# Patient Record
Sex: MALE | Race: WHITE | NOT HISPANIC OR LATINO
[De-identification: names, ages, dates, MRNs, and addresses within clinical notes are randomized per-mention and may not be internally consistent; named-entity substitution may affect disease eponyms.]

---

## 2022-07-06 ENCOUNTER — APPOINTMENT (OUTPATIENT)
Dept: ORTHOPEDIC SURGERY | Facility: CLINIC | Age: 47
End: 2022-07-06

## 2022-07-06 VITALS — BODY MASS INDEX: 29.52 KG/M2 | HEIGHT: 74 IN | WEIGHT: 230 LBS

## 2022-07-06 DIAGNOSIS — Z78.9 OTHER SPECIFIED HEALTH STATUS: ICD-10-CM

## 2022-07-06 PROBLEM — Z00.00 ENCOUNTER FOR PREVENTIVE HEALTH EXAMINATION: Status: ACTIVE | Noted: 2022-07-06

## 2022-07-06 PROCEDURE — 99204 OFFICE O/P NEW MOD 45 MIN: CPT

## 2022-07-06 RX ORDER — TIZANIDINE HYDROCHLORIDE 4 MG/1
4 CAPSULE ORAL
Qty: 40 | Refills: 0 | Status: ACTIVE | COMMUNITY
Start: 2022-07-06 | End: 1900-01-01

## 2022-07-06 NOTE — DISCUSSION/SUMMARY
[Surgical risks reviewed] : Surgical risks reviewed [de-identified] : heat,  stretching\par  \par \par RE:  DALILA PEÑALOZA \par \par Acct #- [id number] \par \par \par \par Attention:  Nurse Reviewer /Medical Director\par \par  \par Based on my patient's condition, I strongly believe that the MRI C spine is medically.necessary.  \par The patient has failed oral meds, injections and PT and conservative treatment in combination or by themselves and therefore needs the MRI.  \par The MRI will dictate further treatment t recommendations.\par

## 2022-07-06 NOTE — HISTORY OF PRESENT ILLNESS
[Neck] : neck [Gradual] : gradual [6] : 6 [Dull/Aching] : dull/aching [Throbbing] : throbbing [Tingling] : tingling [Constant] : constant [Sleep] : sleep [de-identified] : 46 year old male with pain in the right "shoulder" region, started a month ago, no specific injury. He was seen by PCP, placed on NSAIDS and remains symptomatic. He underwent Xrays of the neck. He has persistent numbness tingling into the ring and small finger,. He exercises regularly and has noticed he is weaker on the right, globally, versus the left. He has been undergoing chiro treatment without much difference. [] : no [FreeTextEntry1] : right shoulder [FreeTextEntry5] : No known injury, pain and numbness started occurring about a month ago [FreeTextEntry7] : hand

## 2022-07-06 NOTE — PHYSICAL EXAM
[Right] : right shoulder [Outside films reviewed] : Outside films reviewed [Disc space narrowing] : Disc space narrowing [] : negative impingement testing

## 2022-07-10 ENCOUNTER — FORM ENCOUNTER (OUTPATIENT)
Age: 47
End: 2022-07-10

## 2022-07-11 ENCOUNTER — APPOINTMENT (OUTPATIENT)
Dept: MRI IMAGING | Facility: CLINIC | Age: 47
End: 2022-07-11

## 2022-07-11 PROCEDURE — 72141 MRI NECK SPINE W/O DYE: CPT

## 2022-07-19 ENCOUNTER — APPOINTMENT (OUTPATIENT)
Dept: ORTHOPEDIC SURGERY | Facility: CLINIC | Age: 47
End: 2022-07-19

## 2022-07-19 VITALS — HEIGHT: 74 IN | BODY MASS INDEX: 29.52 KG/M2 | WEIGHT: 230 LBS

## 2022-07-19 PROCEDURE — 20552 NJX 1/MLT TRIGGER POINT 1/2: CPT

## 2022-07-19 PROCEDURE — 99204 OFFICE O/P NEW MOD 45 MIN: CPT | Mod: 25

## 2022-07-19 PROCEDURE — 99214 OFFICE O/P EST MOD 30 MIN: CPT | Mod: 25

## 2022-07-19 NOTE — ASSESSMENT
[FreeTextEntry1] : 47 y/o M presenting with C7 radiculopathy that has been progressively improving. Most likely will continue to improve with time. TPI given to right trapezius. Patient instructed if does not get better with time to follow up and other options with be explored. Patient understood.

## 2022-07-19 NOTE — DATA REVIEWED
[FreeTextEntry1] : MRI C Spine: Central Disc Herniation C3-4/ Large paracentral disc herniation at C7-T1 with impingement on C8 nerve root.

## 2022-07-19 NOTE — PROCEDURE
[Trigger point 1-2 muscle groups] : trigger point 1-2 muscle groups [Right] : of the right [Cervical paraspinal muscle] : cervical paraspinal muscle [___ cc    1%] : Lidocaine ~Vcc of 1%  [___ cc    40mg] : Triamcinolone (Kenalog) ~Vcc of 40 mg  [Call if redness, pain or fever occur] : call if redness, pain or fever occur [Apply ice for 15min out of every hour for the next 12-24 hours as tolerated] : apply ice for 15 minutes out of every hour for the next 12-24 hours as tolerated [Previous OTC use and PT nontherapeutic] : patient has tried OTC's including aspirin, Ibuprofen, Aleve, etc or prescription NSAIDS, and/or exercises at home and/or physical therapy without satisfactory response [Patient had decreased mobility in the joint] : patient had decreased mobility in the joint [Risks, benefits, alternatives discussed / Verbal consent obtained] : the risks benefits, and alternatives have been discussed, and verbal consent was obtained

## 2022-07-19 NOTE — HISTORY OF PRESENT ILLNESS
[5] : 5 [2] : 2 [Sharp] : sharp [Throbbing] : throbbing [Tingling] : tingling [Sleep] : sleep [de-identified] : 45 y/o M presenting with one month of neck pain and RUE pain. States pain goes down the right shoulder blade down the arm. Has numbness in the pinky and ring finger. States went to chiropractor with no relieve. States was given anti-inflammatories from the GP and tizanidine from Dr. Borges. States he feels he is weaker when he works out in the RUE. Goes to the gym and does exercises on his own. States the pain has been improving. Continuing to have the numbness but states the middle finger feeling has returned.  [] : no [FreeTextEntry1] : neck [FreeTextEntry3] : 6 weeks ago [FreeTextEntry5] : pt states that he woke up one day and his neck started to hurt [FreeTextEntry6] : numbness [FreeTextEntry7] : right arm and down to his fingers [de-identified] : motion [de-identified] : 7/6/2022 [de-identified] : Dr. Borges [de-identified] : xrays and MRI

## 2022-07-19 NOTE — IMAGING
[de-identified] : Strength:\par Deltoid: 5/5 b/l\par Biceps: 5/5 b/l\par Tricep: 5/5 b/l\par Wrist flexor/extensors: 5/5 b/l\par Interosseous: 5/5 b/l\par Grasp: 5/5 b/l\par \par Sensation: Grossly Intact\par Hoffmans: Negative\par Spurling: Negative

## 2022-11-11 ENCOUNTER — APPOINTMENT (OUTPATIENT)
Dept: PAIN MANAGEMENT | Facility: CLINIC | Age: 47
End: 2022-11-11

## 2022-11-11 VITALS — HEIGHT: 74 IN | BODY MASS INDEX: 29.52 KG/M2 | WEIGHT: 230 LBS

## 2022-11-11 DIAGNOSIS — Z78.9 OTHER SPECIFIED HEALTH STATUS: ICD-10-CM

## 2022-11-11 PROCEDURE — 99204 OFFICE O/P NEW MOD 45 MIN: CPT

## 2022-11-11 NOTE — ASSESSMENT
[FreeTextEntry1] : After discussing various treatment options with the patient including but not limited to oral medications, physical therapy, exercise, modalities as well as interventional spinal injections, we have decided with the following plan:\par \par 1) Intervention Injection Therapy:\par I personally reviewed the MRI/CT scan images and agree with the radiologist's report. The radiological findings were discussed with the patient.\par The risks, benefits, contents and alternatives to injection were explained in full to the patient. Risks outlined include but are not limited to infection,sepsis, bleeding, post-dural puncture headache, nerve damage, temporary increase in pain, syncopal episode, failure to resolve symptoms, allergic reaction, symptom recurrence, and elevation of blood sugar in diabetics. Cortisone may cause immunosuppression. Patient understands the risks. All questions were answered. After discussion of options, patient requested an injection. Information regarding the injection was given to the patient. Which medications to stop prior to the injection was explained to the patient as well.\par \par Follow up in 1-2 weeks post injection for re-evaluation. \par Continue Home exercises, stretching, activity modification, physical therapy, and conservative care.\par \par Patient is presenting with acute/sub-acute radicular pain with impairment in ADLs and functionality.  The pain has not responded to  conservative care including nsaid therapy and/or physical therapy.  There is no bleeding tendency, unstable medical condition, or systemic infection.\par \par \par C7-T1 Cervical Epidural Steroid Injection under fluoroscopic guidance with image.  none

## 2022-11-11 NOTE — HISTORY OF PRESENT ILLNESS
[Neck] : neck [7] : 7 [5] : 5 [Dull/Aching] : dull/aching [Tingling] : tingling [Constant] : constant [Sleep] : sleep [Rest] : rest [FreeTextEntry1] : 11/10/2022 : Patient presents for initial evaluation. He reports pain in the neck. +tingling in the right arm. pain in the shoulder and left trap since June.  +n/t. feels as if he getting stronger. He went to chiropractor. \par \par Subjective Weakness: Yes\par Numbness/Tingling: Yes\par Bladder/Bowel dysfunction: No\par Gait Abnormalities:No\par Fine motor coordination changes:No\par \par Attempted modalities for current pain complaint:\par See above:\par Medications: No\par \par Injections: No \par \par Previous Spine Surgery: N/A\par \par Imaging:\par MRI Cervical Spine (7/11/22-OC) -Impression: \par 1. Large right paracentral/foraminal extrusion at C7-T1 encroaches upon the cord and impinges the right exiting \par C8 nerve root with severe right foraminal narrowing.\par 2. Slight reversal of the cervical lordosis, multilevel degenerative disc disease, and multiple disc herniations \par with cord impingement and right greater than left exiting C4 nerve root impingement at C3-C4, impingement \par upon the right exiting C5 nerve root with severe right foraminal narrowing at C4-C5, and multilevel \par uncovertebral degenerative changes most severe in the upper cervical spine on the left.\par 3. No acute fracture or cord compression.\par 4. Patient motion degrades image quality on multiple sequences.\par   [] : no [FreeTextEntry6] : numbness  [FreeTextEntry7] : left shoulder down to the fingers [de-identified] : certain activities  [de-identified] : MRI cervical 7/11/22 [de-identified] : m

## 2022-11-28 ENCOUNTER — NON-APPOINTMENT (OUTPATIENT)
Age: 47
End: 2022-11-28

## 2022-12-01 ENCOUNTER — APPOINTMENT (OUTPATIENT)
Age: 47
End: 2022-12-01

## 2022-12-01 PROCEDURE — 62321 NJX INTERLAMINAR CRV/THRC: CPT

## 2022-12-15 PROBLEM — M54.12 CERVICAL RADICULITIS: Status: ACTIVE | Noted: 2022-07-06

## 2022-12-16 ENCOUNTER — APPOINTMENT (OUTPATIENT)
Dept: PAIN MANAGEMENT | Facility: CLINIC | Age: 47
End: 2022-12-16

## 2022-12-16 VITALS — BODY MASS INDEX: 29.52 KG/M2 | WEIGHT: 230 LBS | HEIGHT: 74 IN

## 2022-12-16 DIAGNOSIS — M54.12 RADICULOPATHY, CERVICAL REGION: ICD-10-CM

## 2022-12-16 PROCEDURE — 99214 OFFICE O/P EST MOD 30 MIN: CPT

## 2022-12-16 NOTE — HISTORY OF PRESENT ILLNESS
[Neck] : neck [7] : 7 [5] : 5 [Dull/Aching] : dull/aching [Tingling] : tingling [Constant] : constant [Sleep] : sleep [Rest] : rest [de-identified] : 12/16/22- fu for MÓNICA 12/1.  less than 60% relief.  Will schedule repeat.  \par \par 11/10/2022 : Patient presents for initial evaluation. He reports pain in the neck. +tingling in the right arm. pain in the shoulder and left trap since June.  +n/t. feels as if he getting stronger. He went to chiropractor. \par \par Subjective Weakness: Yes\par Numbness/Tingling: Yes\par Bladder/Bowel dysfunction: No\par Gait Abnormalities:No\par Fine motor coordination changes:No\par \par Attempted modalities for current pain complaint:\par See above:\par Medications: No\par \par Injections: No \par MÓNICA 12/1/22\par \par Previous Spine Surgery: N/A\par \par Imaging:\par MRI Cervical Spine (7/11/22-OC) -Impression: \par 1. Large right paracentral/foraminal extrusion at C7-T1 encroaches upon the cord and impinges the right exiting \par C8 nerve root with severe right foraminal narrowing.\par 2. Slight reversal of the cervical lordosis, multilevel degenerative disc disease, and multiple disc herniations \par with cord impingement and right greater than left exiting C4 nerve root impingement at C3-C4, impingement \par upon the right exiting C5 nerve root with severe right foraminal narrowing at C4-C5, and multilevel \par uncovertebral degenerative changes most severe in the upper cervical spine on the left.\par 3. No acute fracture or cord compression.\par 4. Patient motion degrades image quality on multiple sequences.\par   [] : no [FreeTextEntry6] : numbness  [FreeTextEntry7] : left shoulder down to the fingers [de-identified] : certain activities  [de-identified] : MRI cervical 7/11/22 [FreeTextEntry1] : 12/16/22- fu for MÓNICA 12/1.  \par \par 11/10/2022 : Patient presents for initial evaluation. He reports pain in the neck. +tingling in the right arm. pain in the shoulder and left trap since June.  +n/t. feels as if he getting stronger. He went to chiropractor. \par \par Subjective Weakness: Yes\par Numbness/Tingling: Yes\par Bladder/Bowel dysfunction: No\par Gait Abnormalities:No\par Fine motor coordination changes:No\par \par Attempted modalities for current pain complaint:\par See above:\par Medications: No\par \par Injections: No \par MÓNICA 12/1/22\par \par Previous Spine Surgery: N/A\par \par Imaging:\par MRI Cervical Spine (7/11/22-OC) -Impression: \par 1. Large right paracentral/foraminal extrusion at C7-T1 encroaches upon the cord and impinges the right exiting \par C8 nerve root with severe right foraminal narrowing.\par 2. Slight reversal of the cervical lordosis, multilevel degenerative disc disease, and multiple disc herniations \par with cord impingement and right greater than left exiting C4 nerve root impingement at C3-C4, impingement \par upon the right exiting C5 nerve root with severe right foraminal narrowing at C4-C5, and multilevel \par uncovertebral degenerative changes most severe in the upper cervical spine on the left.\par 3. No acute fracture or cord compression.\par 4. Patient motion degrades image quality on multiple sequences.\par

## 2022-12-16 NOTE — ASSESSMENT
[FreeTextEntry1] : After discussing various treatment options with the patient including but not limited to oral medications, physical therapy, exercise, modalities as well as interventional spinal injections, we have decided with the following plan:\par \par 1) Intervention Injection Therapy:\par I personally reviewed the MRI/CT scan images and agree with the radiologist's report. The radiological findings were discussed with the patient.\par The risks, benefits, contents and alternatives to injection were explained in full to the patient. Risks outlined include but are not limited to infection,sepsis, bleeding, post-dural puncture headache, nerve damage, temporary increase in pain, syncopal episode, failure to resolve symptoms, allergic reaction, symptom recurrence, and elevation of blood sugar in diabetics. Cortisone may cause immunosuppression. Patient understands the risks. All questions were answered. After discussion of options, patient requested an injection. Information regarding the injection was given to the patient. Which medications to stop prior to the injection was explained to the patient as well.\par \par Follow up in 1-2 weeks post injection for re-evaluation. \par Continue Home exercises, stretching, activity modification, physical therapy, and conservative care.\par \par Patient is presenting with acute/sub-acute radicular pain with impairment in ADLs and functionality.  The pain has not responded to  conservative care including nsaid therapy and/or physical therapy.  There is no bleeding tendency, unstable medical condition, or systemic infection.\par \par \par C7-T1 Cervical Epidural Steroid Injection under fluoroscopic guidance with image.

## 2022-12-18 ENCOUNTER — NON-APPOINTMENT (OUTPATIENT)
Age: 47
End: 2022-12-18

## 2023-01-12 ENCOUNTER — APPOINTMENT (OUTPATIENT)
Age: 48
End: 2023-01-12
Payer: COMMERCIAL

## 2023-01-12 PROCEDURE — 64636 DESTROY L/S FACET JNT ADDL: CPT | Mod: 59,LT

## 2023-01-12 PROCEDURE — 62321 NJX INTERLAMINAR CRV/THRC: CPT

## 2023-01-12 PROCEDURE — 64635 DESTROY LUMB/SAC FACET JNT: CPT | Mod: LT

## 2023-01-26 NOTE — HISTORY OF PRESENT ILLNESS
[FreeTextEntry1] : 01/27/23: follow up today after MÓNICA on 01/12/23\par \par 11/10/2022 : Patient presents for initial evaluation. He reports pain in the neck. +tingling in the right arm. pain in the shoulder and left trap since June.  +n/t. feels as if he getting stronger. He went to chiropractor. \par \par Subjective Weakness: Yes\par Numbness/Tingling: Yes\par Bladder/Bowel dysfunction: No\par Gait Abnormalities:No\par Fine motor coordination changes:No\par \par Attempted modalities for current pain complaint:\par See above:\par Medications: No\par \par Injections: No \par MÓNICA 12/1/22\par \par Previous Spine Surgery: N/A\par \par Imaging:\par MRI Cervical Spine (7/11/22-OC) -Impression: \par 1. Large right paracentral/foraminal extrusion at C7-T1 encroaches upon the cord and impinges the right exiting \par C8 nerve root with severe right foraminal narrowing.\par 2. Slight reversal of the cervical lordosis, multilevel degenerative disc disease, and multiple disc herniations \par with cord impingement and right greater than left exiting C4 nerve root impingement at C3-C4, impingement \par upon the right exiting C5 nerve root with severe right foraminal narrowing at C4-C5, and multilevel \par uncovertebral degenerative changes most severe in the upper cervical spine on the left.\par 3. No acute fracture or cord compression.\par 4. Patient motion degrades image quality on multiple sequences.\par   [Neck] : neck [7] : 7 [5] : 5 [Dull/Aching] : dull/aching [Tingling] : tingling [Constant] : constant [Sleep] : sleep [Rest] : rest [] : no [FreeTextEntry6] : numbness  [FreeTextEntry7] : left shoulder down to the fingers [de-identified] : certain activities  [de-identified] : MRI cervical 7/11/22 [de-identified] : 12/16/22- fu for MÓNICA 12/1.  less than 60% relief.  Will schedule repeat.  \par \par 11/10/2022 : Patient presents for initial evaluation. He reports pain in the neck. +tingling in the right arm. pain in the shoulder and left trap since June.  +n/t. feels as if he getting stronger. He went to chiropractor. \par \par Subjective Weakness: Yes\par Numbness/Tingling: Yes\par Bladder/Bowel dysfunction: No\par Gait Abnormalities:No\par Fine motor coordination changes:No\par \par Attempted modalities for current pain complaint:\par See above:\par Medications: No\par \par Injections: No \par MÓNICA 12/1/22\par \par Previous Spine Surgery: N/A\par \par Imaging:\par MRI Cervical Spine (7/11/22-OC) -Impression: \par 1. Large right paracentral/foraminal extrusion at C7-T1 encroaches upon the cord and impinges the right exiting \par C8 nerve root with severe right foraminal narrowing.\par 2. Slight reversal of the cervical lordosis, multilevel degenerative disc disease, and multiple disc herniations \par with cord impingement and right greater than left exiting C4 nerve root impingement at C3-C4, impingement \par upon the right exiting C5 nerve root with severe right foraminal narrowing at C4-C5, and multilevel \par uncovertebral degenerative changes most severe in the upper cervical spine on the left.\par 3. No acute fracture or cord compression.\par 4. Patient motion degrades image quality on multiple sequences.\par

## 2023-01-27 ENCOUNTER — APPOINTMENT (OUTPATIENT)
Dept: PAIN MANAGEMENT | Facility: CLINIC | Age: 48
End: 2023-01-27